# Patient Record
(demographics unavailable — no encounter records)

---
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
## 2024-11-05 NOTE — DISCUSSION/SUMMARY
[de-identified] : After discussing various treatment options with the patient including but not limited to oral medications, physical therapy, exercise modalities as well as interventional spinal injections, we have decided with the following plan:  - Continue home exercises, stretching, activity modification, physical therapy, and conservative care. - Follow-up as needed. - Recommend Gabapentin 300mg BID. Recommend to titrate up gabapentin slowly - first take one pill at night for 3 days and then increase to twice daily. Pt instructed not to drive or operate heavy machinery while on medications.

## 2024-11-05 NOTE — PHYSICAL EXAM
[de-identified] : Constitutional; Appears well, no apparent distress Ability to communicate: Normal  Respiratory: non-labored breathing Skin: No rash noted Head: Normocephalic, atraumatic Neck: no visible thyroid enlargement Eyes: Extraocular movements intact Neurologic: Alert and oriented x3 Psychiatric: normal mood, affect and behavior [] : non-antalgic

## 2024-11-05 NOTE — PROCEDURE
[FreeTextEntry3] : Procedure Name: Trigger Point Injection (1-2 muscle groups): Celestone and Marcaine Trigger Point was performed because of pain.  Celestone: An injection of Celestone 6 mg Marcaine: An injection of Marcaine 10 mg Medication was injected in the Right Gluteus Roosevelt muscle  Patient has tried OTC's including aspirin, Ibuprofen, Aleve etc or prescription NSAIDS, and/or exercises at home and/ or physical therapy without satisfactory response. After verbal consent using sterile preparation and technique.The risks, benefits, and alternatives to cortisone injection were explained in full to the patient. Risks outlined include but are not limited to infection, sepsis, bleeding, scarring, skin discoloration, temporary increase in pain, syncopal episode, failure to resolve symptoms, allergic reaction, symptom recurrence, and elevation of blood sugar in diabetics. Patient understood the risks. All questions were answered. After discussion of options, patient requested an injection. Oral informed consent was obtained and sterile prep was done of the injection site. Sterile technique was utilized for the procedure including the preparation of the solutions used for the injection. Patient tolerated the procedure well. Advised to ice the injection site this evening. Prep with alcohol locally to site. Sterile technique used.

## 2024-12-03 NOTE — HISTORY OF PRESENT ILLNESS
[Never] : never [TextBox_4] : ROGELIO SIMMONS is a 54 year old female who presents to re-eval  diagnosed in the past moderate pito has lost weight did not get  .mandibular advancement device    now- sleeps around 9pm in chair then resumes sleep in bed at 10pm final wake up around 6am snoring repotted by  fatigue in the am nocturia headaches in am  lost about 20 lbs   not seen ent  not using bronchodiators

## 2024-12-03 NOTE — PROCEDURE
[FreeTextEntry1] : previous data reviewed:  PA and lateral chest xray performed using standard projections. Bones and soft tissues structures are unremarkable.Cardiac silhouette appears normal. Lung fields are clear. No infiltrate or masses noted. Mediastinal contours are normal. IMPRESSION: no acute cardiopulmonary disease  normal pfts, flow rate and volume   ahi 24/hr min saturation 85%

## 2025-01-07 NOTE — PROCEDURE
[FreeTextEntry1] : 12/2024- HST 32/hr  previous data reviewed:  PA and lateral chest xray performed using standard projections. Bones and soft tissues structures are unremarkable.Cardiac silhouette appears normal. Lung fields are clear. No infiltrate or masses noted. Mediastinal contours are normal. IMPRESSION: no acute cardiopulmonary disease  normal pfts, flow rate and volume   ahi 24/hr min saturation 85%

## 2025-01-07 NOTE — HISTORY OF PRESENT ILLNESS
[Never] : never [TextBox_4] : ROGELIO SIMMONS is a 54 year old female who presents to review HST results diagnosed in the past moderate pito  dikd not get cpap machine insurance issues  still snoring we retested her

## 2025-03-04 NOTE — PROCEDURE
[FreeTextEntry1] : cpap compliance data feb 2025-march 2025 12/2024- HST 32/hr  PA and lateral chest xray performed using standard projections. Bones and soft tissues structures are unremarkable.Cardiac silhouette appears normal. Lung fields are clear. No infiltrate or masses noted. Mediastinal contours are normal. IMPRESSION: no acute cardiopulmonary disease  normal pfts, flow rate and volume   ahi 24/hr min saturation 85%

## 2025-03-04 NOTE — HISTORY OF PRESENT ILLNESS
[Never] : never [TextBox_4] : ROGELIO SIMMONS is a 54 year old female who presents tof u on pito  diagnosed in the past severe pito   first fu on autocpap  no snoring some dry mouth some mouth opening

## 2025-05-27 NOTE — HISTORY OF PRESENT ILLNESS
[Never] : never [TextBox_4] : ROGELIO SIMMONS is a 54 year old female who presents tof u on pito  diagnosed in the past severe pito      no snoring some dry mouth some mouth opening